# Patient Record
Sex: MALE | Race: WHITE | NOT HISPANIC OR LATINO
[De-identification: names, ages, dates, MRNs, and addresses within clinical notes are randomized per-mention and may not be internally consistent; named-entity substitution may affect disease eponyms.]

---

## 2019-01-25 ENCOUNTER — APPOINTMENT (OUTPATIENT)
Dept: VASCULAR SURGERY | Facility: CLINIC | Age: 39
End: 2019-01-25

## 2021-02-22 ENCOUNTER — APPOINTMENT (OUTPATIENT)
Dept: VASCULAR SURGERY | Facility: CLINIC | Age: 41
End: 2021-02-22
Payer: MEDICAID

## 2021-02-22 PROCEDURE — 99072 ADDL SUPL MATRL&STAF TM PHE: CPT

## 2021-02-22 PROCEDURE — 99204 OFFICE O/P NEW MOD 45 MIN: CPT

## 2021-02-22 PROCEDURE — 93971 EXTREMITY STUDY: CPT

## 2021-02-25 NOTE — PHYSICAL EXAM
[Respiratory Effort] : normal respiratory effort [Normal Rate and Rhythm] : normal rate and rhythm [Alert] : alert [Oriented to Person] : oriented to person [Oriented to Place] : oriented to place [Oriented to Time] : oriented to time [Calm] : calm [2+] : left 2+ [Ankle Swelling On The Right] : of the right ankle [Varicose Veins Of The Right Leg] : of the right leg [] : of the right leg [Abdomen Tenderness] : ~T ~M No abdominal tenderness [de-identified] : Well appearing  [de-identified] : NC/AT  [de-identified] : Supple  [de-identified] : FROM [de-identified] : grossly intact.

## 2021-02-25 NOTE — HISTORY OF PRESENT ILLNESS
[FreeTextEntry1] : 40 year old Male known patient of mine with hx of RLE varicose veins s/p RLE RFA in 2016 presents for initial evaluation of varicose veins of the right lower extremity. Patient had initially undergone RFA in 2016 and had great relief from his symptoms however notes that as of the past few months, he has redeveloped varicosities throughout his left leg. Reports that it has been causing him a lot of pain. Has not tried compression stockings for them. Denies fevers, chills, nausea, vomiting.

## 2021-02-25 NOTE — PROCEDURE
[FreeTextEntry1] : Venous DUS of the RLE in the office today demonstrates extensive reflux of the tributary veins.

## 2021-02-25 NOTE — ASSESSMENT
[FreeTextEntry1] : Yovani Hinds is a 40 year old man with history of varicose veins of the RLE for which he underwent RFA in 2016, now presenting for new/worsening varicosities of the RLE with venous DUS in the office today demonstrating extensive tributary reflux of RLE.\par \par Plan\par 1. Plan to undergo stab phlebectomy of the RLE tributary veins.\par 2. Will coordinate with OR planning and insurance authorization.

## 2021-03-10 DIAGNOSIS — Z01.818 ENCOUNTER FOR OTHER PREPROCEDURAL EXAMINATION: ICD-10-CM

## 2021-03-30 ENCOUNTER — NON-APPOINTMENT (OUTPATIENT)
Age: 41
End: 2021-03-30

## 2021-04-18 ENCOUNTER — OUTPATIENT (OUTPATIENT)
Dept: OUTPATIENT SERVICES | Facility: HOSPITAL | Age: 41
LOS: 1 days | End: 2021-04-18
Payer: COMMERCIAL

## 2021-04-18 DIAGNOSIS — Z01.818 ENCOUNTER FOR OTHER PREPROCEDURAL EXAMINATION: ICD-10-CM

## 2021-04-18 LAB
ALBUMIN SERPL ELPH-MCNC: 4.8 G/DL — SIGNIFICANT CHANGE UP (ref 3.3–5)
ALP SERPL-CCNC: 64 U/L — SIGNIFICANT CHANGE UP (ref 40–120)
ALT FLD-CCNC: 24 U/L — SIGNIFICANT CHANGE UP (ref 10–45)
ANION GAP SERPL CALC-SCNC: 12 MMOL/L — SIGNIFICANT CHANGE UP (ref 5–17)
AST SERPL-CCNC: 24 U/L — SIGNIFICANT CHANGE UP (ref 10–40)
BASOPHILS # BLD AUTO: 0.03 K/UL — SIGNIFICANT CHANGE UP (ref 0–0.2)
BASOPHILS NFR BLD AUTO: 0.4 % — SIGNIFICANT CHANGE UP (ref 0–2)
BILIRUB SERPL-MCNC: 0.3 MG/DL — SIGNIFICANT CHANGE UP (ref 0.2–1.2)
BUN SERPL-MCNC: 15 MG/DL — SIGNIFICANT CHANGE UP (ref 7–23)
CALCIUM SERPL-MCNC: 9.6 MG/DL — SIGNIFICANT CHANGE UP (ref 8.4–10.5)
CHLORIDE SERPL-SCNC: 104 MMOL/L — SIGNIFICANT CHANGE UP (ref 96–108)
CO2 SERPL-SCNC: 25 MMOL/L — SIGNIFICANT CHANGE UP (ref 22–31)
CREAT SERPL-MCNC: 1.13 MG/DL — SIGNIFICANT CHANGE UP (ref 0.5–1.3)
EOSINOPHIL # BLD AUTO: 0.16 K/UL — SIGNIFICANT CHANGE UP (ref 0–0.5)
EOSINOPHIL NFR BLD AUTO: 2.3 % — SIGNIFICANT CHANGE UP (ref 0–6)
GLUCOSE SERPL-MCNC: 94 MG/DL — SIGNIFICANT CHANGE UP (ref 70–99)
HCT VFR BLD CALC: 43.9 % — SIGNIFICANT CHANGE UP (ref 39–50)
HGB BLD-MCNC: 14.5 G/DL — SIGNIFICANT CHANGE UP (ref 13–17)
IMM GRANULOCYTES NFR BLD AUTO: 0.4 % — SIGNIFICANT CHANGE UP (ref 0–1.5)
INR BLD: 0.94 — SIGNIFICANT CHANGE UP (ref 0.88–1.16)
LYMPHOCYTES # BLD AUTO: 2.67 K/UL — SIGNIFICANT CHANGE UP (ref 1–3.3)
LYMPHOCYTES # BLD AUTO: 38 % — SIGNIFICANT CHANGE UP (ref 13–44)
MCHC RBC-ENTMCNC: 29.4 PG — SIGNIFICANT CHANGE UP (ref 27–34)
MCHC RBC-ENTMCNC: 33 GM/DL — SIGNIFICANT CHANGE UP (ref 32–36)
MCV RBC AUTO: 89 FL — SIGNIFICANT CHANGE UP (ref 80–100)
MONOCYTES # BLD AUTO: 0.59 K/UL — SIGNIFICANT CHANGE UP (ref 0–0.9)
MONOCYTES NFR BLD AUTO: 8.4 % — SIGNIFICANT CHANGE UP (ref 2–14)
NEUTROPHILS # BLD AUTO: 3.54 K/UL — SIGNIFICANT CHANGE UP (ref 1.8–7.4)
NEUTROPHILS NFR BLD AUTO: 50.5 % — SIGNIFICANT CHANGE UP (ref 43–77)
NRBC # BLD: 0 /100 WBCS — SIGNIFICANT CHANGE UP (ref 0–0)
PLATELET # BLD AUTO: 234 K/UL — SIGNIFICANT CHANGE UP (ref 150–400)
POTASSIUM SERPL-MCNC: 4.4 MMOL/L — SIGNIFICANT CHANGE UP (ref 3.5–5.3)
POTASSIUM SERPL-SCNC: 4.4 MMOL/L — SIGNIFICANT CHANGE UP (ref 3.5–5.3)
PROT SERPL-MCNC: 7.2 G/DL — SIGNIFICANT CHANGE UP (ref 6–8.3)
PROTHROM AB SERPL-ACNC: 11.3 SEC — SIGNIFICANT CHANGE UP (ref 10.6–13.6)
RBC # BLD: 4.93 M/UL — SIGNIFICANT CHANGE UP (ref 4.2–5.8)
RBC # FLD: 13.1 % — SIGNIFICANT CHANGE UP (ref 10.3–14.5)
SODIUM SERPL-SCNC: 141 MMOL/L — SIGNIFICANT CHANGE UP (ref 135–145)
WBC # BLD: 7.02 K/UL — SIGNIFICANT CHANGE UP (ref 3.8–10.5)
WBC # FLD AUTO: 7.02 K/UL — SIGNIFICANT CHANGE UP (ref 3.8–10.5)

## 2021-04-18 PROCEDURE — 85610 PROTHROMBIN TIME: CPT

## 2021-04-18 PROCEDURE — 93005 ELECTROCARDIOGRAM TRACING: CPT

## 2021-04-18 PROCEDURE — 85025 COMPLETE CBC W/AUTO DIFF WBC: CPT

## 2021-04-18 PROCEDURE — 93010 ELECTROCARDIOGRAM REPORT: CPT

## 2021-04-18 PROCEDURE — 80053 COMPREHEN METABOLIC PANEL: CPT

## 2021-04-19 ENCOUNTER — TRANSCRIPTION ENCOUNTER (OUTPATIENT)
Age: 41
End: 2021-04-19

## 2021-04-19 LAB — SARS-COV-2 N GENE NPH QL NAA+PROBE: NOT DETECTED

## 2021-04-20 ENCOUNTER — OUTPATIENT (OUTPATIENT)
Dept: OUTPATIENT SERVICES | Facility: HOSPITAL | Age: 41
LOS: 1 days | Discharge: HOME | End: 2021-04-20
Payer: MEDICAID

## 2021-04-20 PROCEDURE — 37766 PHLEB VEINS - EXTREM 20+: CPT | Mod: RT,GC

## 2021-04-30 ENCOUNTER — APPOINTMENT (OUTPATIENT)
Dept: VASCULAR SURGERY | Facility: CLINIC | Age: 41
End: 2021-04-30
Payer: MEDICAID

## 2021-04-30 PROCEDURE — 99024 POSTOP FOLLOW-UP VISIT: CPT

## 2021-04-30 NOTE — ADDENDUM
[FreeTextEntry1] : This note was written by Nisha Santamaria on 04/30/2021 acting as scribe for Izzy Connor M.D.\par \par I, Izzy Mendez have read and attest that all the information, medical decision making and discharge instructions within are true and accurate.

## 2021-04-30 NOTE — ASSESSMENT
[FreeTextEntry1] : 39 y/o M known patient of mine w/hx of RLE bulging varicose veins s/p RLE stab phlebectomy on 4/20/2021 presents for follow up.On exam, RLE: resolving black and blue bruising over stab phlebectomy incisions. Sutures in place, underlying skin nicely healed. Small indurated area w/phlebitis over medial calf. Feet are pink, toes are warm to touch with adequate capillary refill. \par \par \par \par Plan: \par Discussed findings and treatment options. Apply heating pad 3- 4 times/day. Aleve or Tylenol for pain. Patient is cleared to gradually resume exercising activities as tolerated. To f/u here in 2 weeks for RLE surveillance. \par

## 2021-04-30 NOTE — PHYSICAL EXAM
[Respiratory Effort] : normal respiratory effort [Normal Rate and Rhythm] : normal rate and rhythm [2+] : right 2+ [Alert] : alert [Oriented to Person] : oriented to person [Oriented to Place] : oriented to place [Oriented to Time] : oriented to time [Calm] : calm [de-identified] : Well appearing  [de-identified] : NC/AT  [de-identified] : Supple  [de-identified] : FROM [de-identified] : RLE: resolving black and blue bruising over stab phlebectomy incisions. Sutures in place, underlying skin nicely healed. Small indurated area w/phlebitis over medial calf. Feet are pink, toes are warm to touch with adequate capillary refill.

## 2021-04-30 NOTE — HISTORY OF PRESENT ILLNESS
[FreeTextEntry1] : 41 y/o M known patient of mine w/hx of RLE bulging varicose veins s/p RLE stab phlebectomy on 4/20/2021 presents for follow up. Patient reports feeling well overall. He is very happy with the outcome. He does note mild induration area over the medial R calf but is not bothersome. He has been staying active walking daily and would like to know when is he cleared to resume exercising activities. Denies any skin breakdown, skin discoloration,  fever or chills.

## 2021-10-04 ENCOUNTER — APPOINTMENT (OUTPATIENT)
Dept: VASCULAR SURGERY | Facility: CLINIC | Age: 41
End: 2021-10-04
Payer: MEDICAID

## 2021-10-04 PROCEDURE — 99213 OFFICE O/P EST LOW 20 MIN: CPT

## 2021-10-04 PROCEDURE — 93971 EXTREMITY STUDY: CPT

## 2021-10-08 NOTE — PROCEDURE
[FreeTextEntry1] : RLE venous US ordered today that demonstrated negative DVT/SVT. R GSV closed. Small VV noted in the lower R calf

## 2021-10-08 NOTE — HISTORY OF PRESENT ILLNESS
[FreeTextEntry1] : 40 y/o M w/hx of RLE bulging varicose veins s/p RLE stab phlebectomy on 4/20/2021 presents for 6 month follow up. Patient reports feeling well overall. He is very happy with the outcome. He does note a pulling sensation on his RLE. He wears compression stocking at the gym because it reduces his symptoms. He has been staying very active. Denies any skin breakdown, skin discoloration,  fever or chills.

## 2021-10-08 NOTE — PHYSICAL EXAM
[Respiratory Effort] : normal respiratory effort [Normal Rate and Rhythm] : normal rate and rhythm [Alert] : alert [Oriented to Person] : oriented to person [Oriented to Place] : oriented to place [Oriented to Time] : oriented to time [Calm] : calm [2+] : left 2+ [Varicose Veins Of Lower Extremities] : bilaterally [Ankle Swelling (On Exam)] : not present [de-identified] : Well appearing  [de-identified] : NC/AT  [de-identified] : Supple  [de-identified] : FROM [de-identified] : RLE: stab phlebectomy incisions healed nicely. Feet are pink, toes are warm to touch with adequate capillary refill.

## 2021-10-08 NOTE — ASSESSMENT
[FreeTextEntry1] : 42 y/o M known patient of mine w/hx of RLE bulging varicose veins s/p RLE stab phlebectomy on 4/20/2021 presents for follow up.On exam, RLE: stab phlebectomy incisions healed nicely. Feet are pink, toes are warm to touch with adequate capillary refill. RLE venous US ordered today that demonstrated negative DVT/SVT. R GSV closed. Small VV noted in the lower R calf.  I explained to the pt this symptoms are not vascular in origin and are likely musculoskeletal. No vascular surgery intervention required at this moment. He should start doing stretching exercises or massage therapy. He will continue to follow up with us here PRN. \par  [Arterial/Venous Disease] : arterial/venous disease

## 2021-10-08 NOTE — ADDENDUM
[FreeTextEntry1] : This note was written by Olga Borges on 10/04/2021 acting as scribe for Izzy Connor M.D.\par I, Dr. Izzy Ortega, personally performed the evaluation and management (E/M) services for this established patient who presents today with (an) existing condition(s).  That E/M includes conducting the examination, assessing all conditions, and (re)establishing/reinforcing a plan of care.  Today, my ACP, Elaine COLLINS, was here to observe my evaluation and management services for this condition to be followed going forward.\par \par \par \par \par